# Patient Record
Sex: FEMALE | Race: WHITE | Employment: FULL TIME | URBAN - METROPOLITAN AREA
[De-identification: names, ages, dates, MRNs, and addresses within clinical notes are randomized per-mention and may not be internally consistent; named-entity substitution may affect disease eponyms.]

---

## 2019-08-23 LAB
ANTIBODY SCREEN, EXTERNAL: NEGATIVE
CHLAMYDIA, EXTERNAL: NEGATIVE
HBSAG, EXTERNAL: NEGATIVE
HIV, EXTERNAL: NON REACTIVE
N. GONORRHEA, EXTERNAL: NEGATIVE
RUBELLA, EXTERNAL: NORMAL
T. PALLIDUM, EXTERNAL: NEGATIVE
TYPE, ABO & RH, EXTERNAL: NORMAL

## 2020-02-10 LAB — GRBS, EXTERNAL: NEGATIVE

## 2020-03-01 ENCOUNTER — HOSPITAL ENCOUNTER (INPATIENT)
Age: 35
LOS: 3 days | Discharge: HOME | End: 2020-03-04
Attending: OBSTETRICS & GYNECOLOGY | Admitting: OBSTETRICS & GYNECOLOGY
Payer: COMMERCIAL

## 2020-03-01 PROBLEM — M05.20 RHEUMATOID ARTERITIS (HCC): Status: ACTIVE | Noted: 2020-03-01

## 2020-03-01 PROBLEM — Z87.42 HISTORY OF INFERTILITY: Status: ACTIVE | Noted: 2020-03-01

## 2020-03-01 PROBLEM — Z3A.39 39 WEEKS GESTATION OF PREGNANCY: Status: ACTIVE | Noted: 2020-03-01

## 2020-03-01 PROBLEM — B00.9 HSV-1 INFECTION: Status: ACTIVE | Noted: 2020-03-01

## 2020-03-01 PROBLEM — E28.2 PCOS (POLYCYSTIC OVARIAN SYNDROME): Status: ACTIVE | Noted: 2020-03-01

## 2020-03-01 LAB
ALBUMIN SERPL-MCNC: 3 G/DL (ref 3.5–5)
ALBUMIN/GLOB SERPL: 0.8 {RATIO} (ref 1.1–2.2)
ALP SERPL-CCNC: 102 U/L (ref 45–117)
ALT SERPL-CCNC: 15 U/L (ref 12–78)
ANION GAP SERPL CALC-SCNC: 6 MMOL/L (ref 5–15)
AST SERPL-CCNC: 11 U/L (ref 15–37)
BILIRUB SERPL-MCNC: 0.3 MG/DL (ref 0.2–1)
BUN SERPL-MCNC: 8 MG/DL (ref 6–20)
BUN/CREAT SERPL: 17 (ref 12–20)
CALCIUM SERPL-MCNC: 9.1 MG/DL (ref 8.5–10.1)
CHLORIDE SERPL-SCNC: 107 MMOL/L (ref 97–108)
CO2 SERPL-SCNC: 23 MMOL/L (ref 21–32)
CREAT SERPL-MCNC: 0.46 MG/DL (ref 0.55–1.02)
CREAT UR-MCNC: 113 MG/DL
ERYTHROCYTE [DISTWIDTH] IN BLOOD BY AUTOMATED COUNT: 12.7 % (ref 11.5–14.5)
GLOBULIN SER CALC-MCNC: 3.7 G/DL (ref 2–4)
GLUCOSE SERPL-MCNC: 83 MG/DL (ref 65–100)
HCT VFR BLD AUTO: 36.7 % (ref 35–47)
HGB BLD-MCNC: 12.4 G/DL (ref 11.5–16)
MCH RBC QN AUTO: 29.5 PG (ref 26–34)
MCHC RBC AUTO-ENTMCNC: 33.8 G/DL (ref 30–36.5)
MCV RBC AUTO: 87.2 FL (ref 80–99)
NRBC # BLD: 0 K/UL (ref 0–0.01)
NRBC BLD-RTO: 0 PER 100 WBC
PLATELET # BLD AUTO: 224 K/UL (ref 150–400)
PMV BLD AUTO: 10.8 FL (ref 8.9–12.9)
POTASSIUM SERPL-SCNC: 4 MMOL/L (ref 3.5–5.1)
PROT SERPL-MCNC: 6.7 G/DL (ref 6.4–8.2)
PROT UR-MCNC: 18 MG/DL (ref 0–11.9)
PROT/CREAT UR-RTO: 0.2
RBC # BLD AUTO: 4.21 M/UL (ref 3.8–5.2)
SODIUM SERPL-SCNC: 136 MMOL/L (ref 136–145)
WBC # BLD AUTO: 8.2 K/UL (ref 3.6–11)

## 2020-03-01 PROCEDURE — 74011250636 HC RX REV CODE- 250/636: Performed by: MIDWIFE

## 2020-03-01 PROCEDURE — 74011250637 HC RX REV CODE- 250/637: Performed by: MIDWIFE

## 2020-03-01 PROCEDURE — 65270000029 HC RM PRIVATE

## 2020-03-01 PROCEDURE — 36415 COLL VENOUS BLD VENIPUNCTURE: CPT

## 2020-03-01 PROCEDURE — 84156 ASSAY OF PROTEIN URINE: CPT

## 2020-03-01 PROCEDURE — 75410000002 HC LABOR FEE PER 1 HR

## 2020-03-01 PROCEDURE — 85027 COMPLETE CBC AUTOMATED: CPT

## 2020-03-01 PROCEDURE — 80053 COMPREHEN METABOLIC PANEL: CPT

## 2020-03-01 RX ORDER — ONDANSETRON 2 MG/ML
8 INJECTION INTRAMUSCULAR; INTRAVENOUS
Status: DISCONTINUED | OUTPATIENT
Start: 2020-03-01 | End: 2020-03-02

## 2020-03-01 RX ORDER — SODIUM CHLORIDE 9 MG/ML
10 INJECTION, SOLUTION INTRAVENOUS ONCE
Status: DISCONTINUED | OUTPATIENT
Start: 2020-03-01 | End: 2020-03-01 | Stop reason: CLARIF

## 2020-03-01 RX ORDER — SODIUM CHLORIDE 0.9 % (FLUSH) 0.9 %
10 SYRINGE (ML) INJECTION AS NEEDED
Status: DISCONTINUED | OUTPATIENT
Start: 2020-03-01 | End: 2020-03-02

## 2020-03-01 RX ORDER — TERBUTALINE SULFATE 1 MG/ML
0.25 INJECTION SUBCUTANEOUS AS NEEDED
Status: DISCONTINUED | OUTPATIENT
Start: 2020-03-01 | End: 2020-03-02

## 2020-03-01 RX ORDER — BUTORPHANOL TARTRATE 1 MG/ML
1 INJECTION INTRAMUSCULAR; INTRAVENOUS
Status: DISCONTINUED | OUTPATIENT
Start: 2020-03-01 | End: 2020-03-02

## 2020-03-01 RX ORDER — SODIUM CHLORIDE 0.9 % (FLUSH) 0.9 %
SYRINGE (ML) INJECTION
Status: COMPLETED
Start: 2020-03-01 | End: 2020-03-01

## 2020-03-01 RX ORDER — ACETAMINOPHEN 500 MG
1000 TABLET ORAL
Status: DISCONTINUED | OUTPATIENT
Start: 2020-03-01 | End: 2020-03-02

## 2020-03-01 RX ORDER — OXYTOCIN/0.9 % SODIUM CHLORIDE 30/500 ML
0-25 PLASTIC BAG, INJECTION (ML) INTRAVENOUS
Status: DISCONTINUED | OUTPATIENT
Start: 2020-03-02 | End: 2020-03-02

## 2020-03-01 RX ORDER — FENTANYL CITRATE 50 UG/ML
100 INJECTION, SOLUTION INTRAMUSCULAR; INTRAVENOUS
Status: DISCONTINUED | OUTPATIENT
Start: 2020-03-01 | End: 2020-03-02

## 2020-03-01 RX ORDER — SODIUM CHLORIDE, SODIUM LACTATE, POTASSIUM CHLORIDE, CALCIUM CHLORIDE 600; 310; 30; 20 MG/100ML; MG/100ML; MG/100ML; MG/100ML
125 INJECTION, SOLUTION INTRAVENOUS CONTINUOUS
Status: DISCONTINUED | OUTPATIENT
Start: 2020-03-01 | End: 2020-03-02

## 2020-03-01 RX ORDER — ZOLPIDEM TARTRATE 5 MG/1
5 TABLET ORAL
Status: DISCONTINUED | OUTPATIENT
Start: 2020-03-01 | End: 2020-03-02

## 2020-03-01 RX ADMIN — MISOPROSTOL 50 MCG: 100 TABLET ORAL at 18:57

## 2020-03-01 RX ADMIN — Medication 10 ML: at 19:25

## 2020-03-01 RX ADMIN — MISOPROSTOL 25 MCG: 100 TABLET ORAL at 16:45

## 2020-03-01 RX ADMIN — Medication 10 ML: at 23:11

## 2020-03-01 RX ADMIN — BUTORPHANOL TARTRATE 1 MG: 1 INJECTION, SOLUTION INTRAMUSCULAR; INTRAVENOUS at 23:11

## 2020-03-01 RX ADMIN — ACETAMINOPHEN 1000 MG: 500 TABLET ORAL at 22:02

## 2020-03-01 NOTE — H&P
SUNG History & Physical    Subjective:     Flori Leach is a 36 y.o. female  SIUP @ 39w0d by Estimated Date of Delivery: None noted. who arrives to L&D for induction of labor. Endorses +FM, intact membranes; Pt denies vaginal bleeding, fever/chills, chest pain, SOB, HA, scotomata, sudden swelling, nor malaise. Pregnancy problems include:   - Body mass index is 29.44 kg/m². 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    PCOS (polycystic ovarian syndrome) E28.2       Allergies  - No Known Allergies    Prenatal Labs  No results found for: RUBELLAEXT, GRBSEXT, HBSAGEXT, HIVEXT, RPREXT, TPALEXT, GONNOEXT, CHLAMEXT, [id number], [id number], RUBELLAEXT, GRBSEXT, HBSAGEXT, HIVEXT, RPREXT, TPALEXT, GONNOEXT, CHLAMEXT, [id number], [id number]      OB History  OB History        1    Para        Term                AB        Living           SAB        TAB        Ectopic        Molar        Multiple        Live Births                     PMHx  Past Medical History:   Diagnosis Date    Anemia     During RA treatments    Herpes simplex virus (HSV) infection     Cold sores, last outbreak years ago   24 [facility], female     PCOS, conceived on her own after treatment stopped    Polycystic disease, ovaries     No meds    Rheumatoid arthritis ([facility]         PSHx  History reviewed. No pertinent surgical history.      F/SHx  Family History   Problem Relation Age of Onset    Diabetes Mother     Hypertension Father     No Known Problems Sister       Social History     Socioeconomic History    Marital status:      Spouse name: Not on file    Number of children: Not on file    Years of education: Not on file    Highest education level: Not on file   Occupational History    Not on file   Social Needs    Financial resource strain: Not on file   [place] insecurity:     Worry: Not on file     Inability: Not on file    Transportation needs:     Medical: Not on file     Non-medical: Not on file   Tobacco Use    Smoking status: Never Smoker    Smokeless tobacco: Never Used   Substance and Sexual Activity    Alcohol use: Not Currently    Drug use: Not on file    Sexual activity: Not on file       Home Medications:  None        Review of Systems:  A comprehensive review of systems was negative except for that written in the History of Present Illness. Objective:     Vitals:    03/01/20 1653 03/01/20 1719   BP:  (!) 131/92   Pulse:  (!) 104   Resp:  14   Temp:  98.6 °F (37 °C)   Weight: 85.3 kg (188 lb)    Height: 5' 7\" (1.702 m)       Body mass index is 29.44 kg/m². Physical Exam:  General:  Alert, cooperative, no distress, appears stated age. Lungs:   Clear to auscultation bilaterally. Symmetrical expansion, non-labored   Heart:  Regular rate and rhythm   Abdomen:   Soft, non-tender. Gravid. Extremities: Extremities normal, atraumatic, no cyanosis or edema. Skin: Skin color, texture, turgor normal. No rashes or lesions   /Cervical Exam: no lesions, Cervical Exam  Dilation (cm): 1  Eff: 60 %  Station: -3  Position: Mid  Cervical Consistency: Soft  Vaginal exam done by? : Luda Connors CNM  Baby Position: Vertex     Cervical Consistency: Medium (1 Point)   Cervical Dilation: 1 cm to 2 cm (1 Point)   Cervical Effacement: 40-50% (1 Point)   Cervical Position: Middle (1 Point)   Fetal Station: -4 to -3 (0 Points)   Dodge's Score Total: 4    Pelvimetry: Adequate  Fetal Size: AGA      Electronic Fetal Monitoring    Fetal Heart Rate:      Patient Vitals for the past 4 hrs:    Mode Fetal Heart Rate Variability Decelerations Accelerations RN Reviewed Strip?   03/01/20 1815 External 130    Yes   03/01/20 1800 External 130 6-25 BPM None Yes Yes   03/01/20 1745 External 125    Yes   03/01/20 1730 External 120 6-25 BPM None Yes Yes   03/01/20 1715 External 120    Yes   20 1700 External 125 6-25 BPM None Yes Yes   20 1645 External 125    Yes   20 1624 External 140 6-25 BPM None Yes Yes   20 1608 External 140         Uterine Contractions: uterine irritability, relaxed to palpation    Assessment:   36 y.o. female  SIUP @  39w0 d by ZHANE of Estimated Date of Delivery: None noted. 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     EFM:   - Category 1  - NST: Fetal NST Findings: reactive    Plan:   Admit to L&D for Inpatient     Tests/Labs/Monitoring ordered: Electronic Fetal Monitoring Continuous EFM and NST, SVE, CBC w/o DIF and Sample to blood bank (hold)  GBS negative  ambien for sleep    Induction:   - 25mcg miso vaginally; then in 2 hrs, if no uterine tachysystole and Cat I    - in 2-4 hours reassess for ICC placement   - 50mcg miso buccal q4hrs;   - reassess for ICC placement s/p 2 doses of miso   - Goal YEPEZ of 7+ prior to initiating pitocin titration    - IF ICC placement successful, 12hrs post ICC or if self displaces, initiate pitocin titration    Review of prenatal records  Review and sign consents   Patient Education:   - Pain management, as indicated; R/B/SE of IV meds, nitrous oxide, and epidural discussed.   -Induction of labor R/B/SE of misoprostol, pitocin, intracervical catheter    CNM management    Postpartum Plan:  Vaccinations (maternal) needed: none  Infant Feeding plans: Breastfeeding    Patient has been counseled regarding this plan of care and is in agreement; all questions answered.     Collaborative MD: Aldair Mcghee      Signed By:  David Keene CNM      3/1/2020 4:27 PM

## 2020-03-01 NOTE — PROGRESS NOTES
1600 Pt arrived ambulatory from home for scheduled induction. 1640 BREANN Lucero [facility], at bedside, discussing plan of care with pt and . SVE 1/60/-3, Cytotec placed without difficulty. 1940 Bedside report given to Sienna Colmenares RN.

## 2020-03-02 ENCOUNTER — ANESTHESIA (OUTPATIENT)
Dept: LABOR AND DELIVERY | Age: 35
End: 2020-03-02
Payer: COMMERCIAL

## 2020-03-02 ENCOUNTER — ANESTHESIA EVENT (OUTPATIENT)
Dept: LABOR AND DELIVERY | Age: 35
End: 2020-03-02
Payer: COMMERCIAL

## 2020-03-02 PROBLEM — Z34.90 PREGNANCY: Status: ACTIVE | Noted: 2020-03-02

## 2020-03-02 PROCEDURE — 74011250636 HC RX REV CODE- 250/636: Performed by: ANESTHESIOLOGY

## 2020-03-02 PROCEDURE — 3E0P7VZ INTRODUCTION OF HORMONE INTO FEMALE REPRODUCTIVE, VIA NATURAL OR ARTIFICIAL OPENING: ICD-10-PCS | Performed by: OBSTETRICS & GYNECOLOGY

## 2020-03-02 PROCEDURE — 75410000003 HC RECOV DEL/VAG/CSECN EA 0.5 HR

## 2020-03-02 PROCEDURE — 74011000250 HC RX REV CODE- 250: Performed by: ANESTHESIOLOGY

## 2020-03-02 PROCEDURE — 74011250637 HC RX REV CODE- 250/637: Performed by: OBSTETRICS & GYNECOLOGY

## 2020-03-02 PROCEDURE — 74011250637 HC RX REV CODE- 250/637: Performed by: MIDWIFE

## 2020-03-02 PROCEDURE — 77030002933 HC SUT MCRYL J&J -A

## 2020-03-02 PROCEDURE — 65410000002 HC RM PRIVATE OB

## 2020-03-02 PROCEDURE — 00HU33Z INSERTION OF INFUSION DEVICE INTO SPINAL CANAL, PERCUTANEOUS APPROACH: ICD-10-PCS | Performed by: ANESTHESIOLOGY

## 2020-03-02 PROCEDURE — 76060000078 HC EPIDURAL ANESTHESIA

## 2020-03-02 PROCEDURE — A4300 CATH IMPL VASC ACCESS PORTAL: HCPCS

## 2020-03-02 PROCEDURE — 77030031139 HC SUT VCRL2 J&J -A

## 2020-03-02 PROCEDURE — 74011250636 HC RX REV CODE- 250/636: Performed by: MIDWIFE

## 2020-03-02 PROCEDURE — 77030019905 HC CATH URETH INTMIT MDII -A

## 2020-03-02 PROCEDURE — 3E033VJ INTRODUCTION OF OTHER HORMONE INTO PERIPHERAL VEIN, PERCUTANEOUS APPROACH: ICD-10-PCS | Performed by: OBSTETRICS & GYNECOLOGY

## 2020-03-02 PROCEDURE — 75410000002 HC LABOR FEE PER 1 HR

## 2020-03-02 PROCEDURE — 75410000000 HC DELIVERY VAGINAL/SINGLE

## 2020-03-02 PROCEDURE — 0KQM0ZZ REPAIR PERINEUM MUSCLE, OPEN APPROACH: ICD-10-PCS | Performed by: OBSTETRICS & GYNECOLOGY

## 2020-03-02 RX ORDER — LIDOCAINE HYDROCHLORIDE AND EPINEPHRINE 15; 5 MG/ML; UG/ML
4.5 INJECTION, SOLUTION EPIDURAL AS NEEDED
Status: DISCONTINUED | OUTPATIENT
Start: 2020-03-02 | End: 2020-03-02

## 2020-03-02 RX ORDER — FENTANYL/BUPIVACAINE/NS/PF 2-1250MCG
1-16 PREFILLED PUMP RESERVOIR EPIDURAL CONTINUOUS
Status: DISCONTINUED | OUTPATIENT
Start: 2020-03-02 | End: 2020-03-02

## 2020-03-02 RX ORDER — AMMONIA 15 % (W/V)
1 AMPUL (EA) INHALATION AS NEEDED
Status: DISCONTINUED | OUTPATIENT
Start: 2020-03-02 | End: 2020-03-04 | Stop reason: HOSPADM

## 2020-03-02 RX ORDER — BUPIVACAINE HYDROCHLORIDE 2.5 MG/ML
75 INJECTION, SOLUTION EPIDURAL; INFILTRATION; INTRACAUDAL ONCE
Status: DISCONTINUED | OUTPATIENT
Start: 2020-03-02 | End: 2020-03-02

## 2020-03-02 RX ORDER — IBUPROFEN 400 MG/1
800 TABLET ORAL EVERY 8 HOURS
Status: DISCONTINUED | OUTPATIENT
Start: 2020-03-02 | End: 2020-03-04 | Stop reason: HOSPADM

## 2020-03-02 RX ORDER — OXYTOCIN/RINGER'S LACTATE 20/1000 ML
125 PLASTIC BAG, INJECTION (ML) INTRAVENOUS AS NEEDED
Status: DISCONTINUED | OUTPATIENT
Start: 2020-03-02 | End: 2020-03-04 | Stop reason: HOSPADM

## 2020-03-02 RX ORDER — FENTANYL CITRATE 50 UG/ML
100 INJECTION, SOLUTION INTRAMUSCULAR; INTRAVENOUS ONCE
Status: DISCONTINUED | OUTPATIENT
Start: 2020-03-02 | End: 2020-03-02

## 2020-03-02 RX ORDER — SIMETHICONE 80 MG
80 TABLET,CHEWABLE ORAL
Status: DISCONTINUED | OUTPATIENT
Start: 2020-03-02 | End: 2020-03-04 | Stop reason: HOSPADM

## 2020-03-02 RX ORDER — LIDOCAINE HYDROCHLORIDE AND EPINEPHRINE 15; 5 MG/ML; UG/ML
INJECTION, SOLUTION EPIDURAL AS NEEDED
Status: DISCONTINUED | OUTPATIENT
Start: 2020-03-02 | End: 2020-03-02 | Stop reason: HOSPADM

## 2020-03-02 RX ORDER — SODIUM CHLORIDE 0.9 % (FLUSH) 0.9 %
5-40 SYRINGE (ML) INJECTION EVERY 8 HOURS
Status: DISCONTINUED | OUTPATIENT
Start: 2020-03-02 | End: 2020-03-03

## 2020-03-02 RX ORDER — HYDROCORTISONE ACETATE PRAMOXINE HCL 2.5; 1 G/100G; G/100G
CREAM TOPICAL AS NEEDED
Status: DISCONTINUED | OUTPATIENT
Start: 2020-03-02 | End: 2020-03-04 | Stop reason: HOSPADM

## 2020-03-02 RX ORDER — OXYTOCIN/RINGER'S LACTATE 20/1000 ML
999 PLASTIC BAG, INJECTION (ML) INTRAVENOUS AS NEEDED
Status: DISCONTINUED | OUTPATIENT
Start: 2020-03-02 | End: 2020-03-04 | Stop reason: HOSPADM

## 2020-03-02 RX ORDER — DOCUSATE SODIUM 100 MG/1
100 CAPSULE, LIQUID FILLED ORAL
Status: DISCONTINUED | OUTPATIENT
Start: 2020-03-02 | End: 2020-03-04 | Stop reason: HOSPADM

## 2020-03-02 RX ORDER — NALOXONE HYDROCHLORIDE 0.4 MG/ML
0.4 INJECTION, SOLUTION INTRAMUSCULAR; INTRAVENOUS; SUBCUTANEOUS AS NEEDED
Status: DISCONTINUED | OUTPATIENT
Start: 2020-03-02 | End: 2020-03-02

## 2020-03-02 RX ORDER — SODIUM CHLORIDE 0.9 % (FLUSH) 0.9 %
5-40 SYRINGE (ML) INJECTION AS NEEDED
Status: DISCONTINUED | OUTPATIENT
Start: 2020-03-02 | End: 2020-03-04 | Stop reason: HOSPADM

## 2020-03-02 RX ORDER — ACETAMINOPHEN 325 MG/1
650 TABLET ORAL
Status: DISCONTINUED | OUTPATIENT
Start: 2020-03-02 | End: 2020-03-04 | Stop reason: HOSPADM

## 2020-03-02 RX ORDER — EPHEDRINE SULFATE/0.9% NACL/PF 50 MG/5 ML
12.5 SYRINGE (ML) INTRAVENOUS
Status: DISCONTINUED | OUTPATIENT
Start: 2020-03-02 | End: 2020-03-02

## 2020-03-02 RX ORDER — ONDANSETRON 4 MG/1
4 TABLET, ORALLY DISINTEGRATING ORAL
Status: DISCONTINUED | OUTPATIENT
Start: 2020-03-02 | End: 2020-03-04 | Stop reason: HOSPADM

## 2020-03-02 RX ORDER — OXYCODONE AND ACETAMINOPHEN 5; 325 MG/1; MG/1
1 TABLET ORAL
Status: DISCONTINUED | OUTPATIENT
Start: 2020-03-02 | End: 2020-03-04 | Stop reason: HOSPADM

## 2020-03-02 RX ORDER — FENTANYL CITRATE 50 UG/ML
INJECTION, SOLUTION INTRAMUSCULAR; INTRAVENOUS AS NEEDED
Status: DISCONTINUED | OUTPATIENT
Start: 2020-03-02 | End: 2020-03-02 | Stop reason: HOSPADM

## 2020-03-02 RX ORDER — BUPIVACAINE HYDROCHLORIDE 2.5 MG/ML
INJECTION, SOLUTION EPIDURAL; INFILTRATION; INTRACAUDAL AS NEEDED
Status: DISCONTINUED | OUTPATIENT
Start: 2020-03-02 | End: 2020-03-02 | Stop reason: HOSPADM

## 2020-03-02 RX ORDER — HYDROCORTISONE 1 %
CREAM (GRAM) TOPICAL AS NEEDED
Status: DISCONTINUED | OUTPATIENT
Start: 2020-03-02 | End: 2020-03-04 | Stop reason: HOSPADM

## 2020-03-02 RX ORDER — DIPHENHYDRAMINE HCL 25 MG
25 CAPSULE ORAL
Status: DISCONTINUED | OUTPATIENT
Start: 2020-03-02 | End: 2020-03-04 | Stop reason: HOSPADM

## 2020-03-02 RX ADMIN — OXYTOCIN-SODIUM CHLORIDE 0.9% IV SOLN 30 UNIT/500ML 333 MILLI-UNITS/MIN: 30-0.9/5 SOLUTION at 18:55

## 2020-03-02 RX ADMIN — BUPIVACAINE HYDROCHLORIDE 3 ML: 2.5 INJECTION, SOLUTION EPIDURAL; INFILTRATION; INTRACAUDAL; PERINEURAL at 10:46

## 2020-03-02 RX ADMIN — SODIUM CHLORIDE, SODIUM LACTATE, POTASSIUM CHLORIDE, AND CALCIUM CHLORIDE 125 ML/HR: 600; 310; 30; 20 INJECTION, SOLUTION INTRAVENOUS at 16:12

## 2020-03-02 RX ADMIN — OXYTOCIN-SODIUM CHLORIDE 0.9% IV SOLN 30 UNIT/500ML 8 MILLI-UNITS/MIN: 30-0.9/5 SOLUTION at 13:31

## 2020-03-02 RX ADMIN — SODIUM CHLORIDE, SODIUM LACTATE, POTASSIUM CHLORIDE, AND CALCIUM CHLORIDE 125 ML/HR: 600; 310; 30; 20 INJECTION, SOLUTION INTRAVENOUS at 06:23

## 2020-03-02 RX ADMIN — BUPIVACAINE HYDROCHLORIDE 3 ML: 2.5 INJECTION, SOLUTION EPIDURAL; INFILTRATION; INTRACAUDAL; PERINEURAL at 10:49

## 2020-03-02 RX ADMIN — FENTANYL CITRATE 100 MCG: 50 INJECTION, SOLUTION INTRAMUSCULAR; INTRAVENOUS at 10:43

## 2020-03-02 RX ADMIN — LIDOCAINE HYDROCHLORIDE,EPINEPHRINE BITARTRATE 5 ML: 15; .005 INJECTION, SOLUTION EPIDURAL; INFILTRATION; INTRACAUDAL; PERINEURAL at 10:39

## 2020-03-02 RX ADMIN — IBUPROFEN 800 MG: 400 TABLET, FILM COATED ORAL at 20:59

## 2020-03-02 RX ADMIN — Medication 10 ML: at 06:23

## 2020-03-02 RX ADMIN — SODIUM CHLORIDE, SODIUM LACTATE, POTASSIUM CHLORIDE, AND CALCIUM CHLORIDE 999 ML/HR: 600; 310; 30; 20 INJECTION, SOLUTION INTRAVENOUS at 10:02

## 2020-03-02 RX ADMIN — SODIUM CHLORIDE, SODIUM LACTATE, POTASSIUM CHLORIDE, AND CALCIUM CHLORIDE 999 ML/HR: 600; 310; 30; 20 INJECTION, SOLUTION INTRAVENOUS at 10:38

## 2020-03-02 RX ADMIN — OXYTOCIN-SODIUM CHLORIDE 0.9% IV SOLN 30 UNIT/500ML 11 MILLI-UNITS/MIN: 30-0.9/5 SOLUTION at 16:41

## 2020-03-02 RX ADMIN — Medication 10 ML/HR: at 10:57

## 2020-03-02 RX ADMIN — OXYTOCIN-SODIUM CHLORIDE 0.9% IV SOLN 30 UNIT/500ML 2 MILLI-UNITS/MIN: 30-0.9/5 SOLUTION at 06:26

## 2020-03-02 RX ADMIN — ZOLPIDEM TARTRATE 5 MG: 5 TABLET ORAL at 01:59

## 2020-03-02 NOTE — PROGRESS NOTES
0740 Bedside, Verbal and Written shift change report given to VIKTORIYA Clarke, MICHELLE (oncoming nurse) by VIKTORIYA Latif, MICHELLE (offgoing nurse). Report included the following information SBAR, Intake/Output, MAR, Accordion, Recent Results, Med Rec Status and Alarm Parameters . 1762 patient switched to portable monitoring, patient encouraged to get up out of bed and move. 2278 Dr. Arron Cisneros at bedside discussing plan of care. Plan to assess cervical ripening balloon around 0945    0955 patient called out. Appears gross rupture of membranes. 7918 spoke with Dr. Arron Cisneros, ok to removed cervical ripening balloon. 56 Dr. Brandi Rivas at bedside for epidural.    1145 Bedside, Verbal and Written shift change report given to ROBBIE Nuñez RN and ROBBIE Matias RN (oncoming nurse) by VIKTORIYA Clarke RN (offgoing nurse). Report included the following information SBAR, MAR, Accordion, Recent Results, Med Rec Status and Alarm Parameters .

## 2020-03-02 NOTE — ANESTHESIA PROCEDURE NOTES
Epidural Block    Performed by: Alva Pulido MD  Authorized by: Alva Pulido MD     Pre-Procedure  Indication: at surgeon's request and post-op pain management    Preanesthetic Checklist: patient identified, risks and benefits discussed, anesthesia consent, site marked, patient being monitored, timeout performed and anesthesia consent      Epidural:   Patient position:  Seated  Prep region:  Lumbar  Prep: DuraPrep    Location:  L2-3    Needle and Epidural Catheter:   Needle Type:  Tuohy  Needle Gauge:  17 G  Injection Technique:  Loss of resistance using saline  Attempts:  1  Catheter Size:  19 G  Catheter at Skin Depth (cm):  13  Depth in Epidural Space (cm):  4  Events: no blood with aspiration, no cerebrospinal fluid with aspiration and no paresthesia    Test Dose:  Lidocaine 1.5% w/ epi and negative    Assessment:   Catheter Secured:  Tegaderm  Insertion:  Uncomplicated  Patient tolerance:  Patient tolerated the procedure well with no immediate complications

## 2020-03-02 NOTE — PROGRESS NOTES
Patient comfortable with epidural but she is feeling some pressure.     FHTs baseline 130, moderate variability, accels present, decels absent  TOCO q 2-3 minutes  Cx 10/100/0  Pit @    Will begin pushing when pt feeling constant pressure or within the hour, whichever comes first.

## 2020-03-02 NOTE — ANESTHESIA PREPROCEDURE EVALUATION
Relevant Problems   No relevant active problems       Anesthetic History   No history of anesthetic complications            Review of Systems / Medical History  Patient summary reviewed, nursing notes reviewed and pertinent labs reviewed    Pulmonary  Within defined limits                 Neuro/Psych   Within defined limits           Cardiovascular  Within defined limits                Exercise tolerance: >4 METS     GI/Hepatic/Renal  Within defined limits              Endo/Other        Arthritis and anemia     Other Findings   Comments: RA           Physical Exam    Airway  Mallampati: II  TM Distance: > 6 cm  Neck ROM: normal range of motion   Mouth opening: Normal     Cardiovascular  Regular rate and rhythm,  S1 and S2 normal,  no murmur, click, rub, or gallop             Dental  No notable dental hx       Pulmonary  Breath sounds clear to auscultation               Abdominal  GI exam deferred       Other Findings            Anesthetic Plan    ASA: 2  Anesthesia type: epidural          Induction: Intravenous  Anesthetic plan and risks discussed with: Patient

## 2020-03-02 NOTE — PROGRESS NOTES
9500 Bedside, Verbal and Written shift change report given to VIKTORIYA Moses, RN (oncoming nurse) by VIKTORIYA Doll RN (offgoing nurse). Report included the following information SBAR, Intake/Output, MAR, Accordion, Recent Results, Med Rec Status and Alarm Parameters . 8005 patient switched to portable monitoring, patient encouraged to get up out of bed and move. 5976 Dr. Elliot Reeves at bedside discussing plan of care. Plan to assess cervical ripening balloon around [id number] 4195 patient called out. Appears gross rupture of membranes. 3484 spoke with Dr. Elliot Reeves, ok to removed cervical ripening balloon.

## 2020-03-02 NOTE — PROGRESS NOTES
Assumed care of patient this AM from DELANEY Bailey. Patient is a 37 yo G1 @ 39 1/7 wks undergoing IOL for well controlled RA. She has been off her monthly infusions since the start of pregnancy and reports no flare ups. She is currently on pitocin @ 6 with a cervical ripening balloon. Mild range BPs with negative symptoms and neg labs.     FHTs baseline 140, moderate variability, accels present, decels absent  TOCO q 2 minutes  Cx deferred (will check to see if balloon comes out at 0945)  /92    Cont current management plan

## 2020-03-02 NOTE — PROGRESS NOTES
Pt coping well with misoprostol, consents to [facility] placement. Cervical Exam  Dilation (cm): 1  Eff: 70 %  Station: -3  Position: Anterior  Cervical Consistency: Soft  Vaginal exam done by? : annie ivey cnm  Baby Position: Vertex    Patient Vitals for the past 12 hrs:   Temp Pulse Resp BP   03/01/20 2132  100  132/90   03/01/20 2005 98.4 °F (36.9 °C) 82 16 (!) 135/93   03/01/20 1719 98.6 °F (37 °C) (!) 104 14 (!) 131/92       ICC placed with 60/60 NS in each balloon  Pre-E labs ordered for mildly elevated BPs- explained indication for labs to pt/FOB  Will continue to administer 50mcg miso q4h, with last dose @ 0300.  Pitocin to start at 0400  D/C ICC @ 0930 if it has not dislodged on its own  EFM continuous for 2 hours after miso, may have intermittent EFM if only irregular, mild UCs and Cat I    Josafat Beck CNM  03/01/20  9:51 PM

## 2020-03-03 PROCEDURE — 65410000002 HC RM PRIVATE OB

## 2020-03-03 PROCEDURE — 74011250637 HC RX REV CODE- 250/637: Performed by: OBSTETRICS & GYNECOLOGY

## 2020-03-03 RX ORDER — IBUPROFEN 600 MG/1
600 TABLET ORAL
Qty: 30 TAB | Refills: 1 | Status: SHIPPED | OUTPATIENT
Start: 2020-03-03

## 2020-03-03 RX ADMIN — IBUPROFEN 800 MG: 400 TABLET, FILM COATED ORAL at 21:48

## 2020-03-03 RX ADMIN — ACETAMINOPHEN 650 MG: 325 TABLET ORAL at 14:07

## 2020-03-03 RX ADMIN — ACETAMINOPHEN 650 MG: 325 TABLET ORAL at 05:55

## 2020-03-03 RX ADMIN — ACETAMINOPHEN 650 MG: 325 TABLET ORAL at 10:30

## 2020-03-03 RX ADMIN — DOCUSATE SODIUM 100 MG: 100 CAPSULE, LIQUID FILLED ORAL at 21:50

## 2020-03-03 RX ADMIN — IBUPROFEN 800 MG: 400 TABLET, FILM COATED ORAL at 05:55

## 2020-03-03 RX ADMIN — ACETAMINOPHEN 650 MG: 325 TABLET ORAL at 02:39

## 2020-03-03 RX ADMIN — IBUPROFEN 800 MG: 400 TABLET, FILM COATED ORAL at 14:07

## 2020-03-03 RX ADMIN — DOCUSATE SODIUM 100 MG: 100 CAPSULE, LIQUID FILLED ORAL at 02:39

## 2020-03-03 NOTE — L&D DELIVERY NOTE
Delivery Summary  Patient: Dave Cowan             Circumcision:   NA-female [place])  Additional Delivery Comments - Patient underwent an uncomplicated IOL for RA. She progressed to full dilation and pushed for approximately 2 hours to deliver a VFI in OA position with loose nuchal cord easily reduced. No difficulty with delivery of shoulders/body. Cord clamping was delayed x 3 minutes then cut by FOB. Intact placenta with 3VC delivered easily. 2nd degree vaginal and periurethral/periclitoral lacerations were repaired in standard fashion with 2.0 monocryl and 3.0 vicryl, respectively. . Information for the patient's :  Akil Casey [[id number]]       Labor Events:    Labor: No    Steroids: None   Cervical Ripening Date/Time: 3/1/2020 4:45 PM   Cervical Ripening Type: Misoprostol; Ricketts/EASI   Antibiotics During Labor: No   Rupture Identifier: Sac 1    Rupture Date/Time: 3/2/2020 9:55 AM   Rupture Type: SROM   Amniotic Fluid Volume: Moderate    Amniotic Fluid Description: Clear    Amniotic Fluid Odor: None    Induction: Oxytocin       Induction Date/Time: 3/2/2020 6:30 AM    Indications for Induction: Elective; Other(comment)    Augmentation: None   Augmentation Date/Time:      Indications for Augmentation:     Labor complications: None       Additional complications:        Delivery Events:  Indications For Episiotomy:     Episiotomy: None   Perineal Laceration(s): None   Repaired:     Periurethral Laceration Location:      Repaired: Yes   Labial Laceration Location:     Repaired:     Sulcal Laceration Location:     Repaired:     Vaginal Laceration Location: right   Repaired: Yes   Cervical Laceration Location:     Repaired:     Repair Suture: Vicryl 3-0;Chromic 2-0   Number of Repair Packets: 2   Estimated Blood Loss (ml):  ml     Delivery Date: 3/2/2020    Delivery Time: 6:05 PM  Delivery Type: Vaginal, Spontaneous  Sex:  Female    Gestational Age: 36w3d Delivery Clinician:  Kelly Blank  Living Status: Living   Delivery Location: L&D rm 4          APGARS  One minute Five minutes Ten minutes   Skin color: 1   1        Heart rate: 2   2        Grimace: 2   2        Muscle tone: 2   2        Breathin   2        Totals: 8   9            Presentation: Vertex    Position:        Resuscitation Method:  Suctioning-bulb; Tactile Stimulation     Meconium Stained: None      Cord Information: 3 Vessels  Complications: Nuchal Cord Without Compressions  Cord around: head  Delayed cord clamping? Yes  Cord clamped date/time:   Disposition of Cord Blood:      Blood Gases Sent?: No    Placenta:  Date/Time: 3/2/2020  6:18 PM  Removal: Manual Removal      Appearance: Normal     Runnells Measurements:  Birth Weight: 3.105 kg      Birth Length: 49.5 cm      Head Circumference: 33.5 cm      Chest Circumference:       Abdominal Girth: 30 cm    Other Providers:   Akhil PEÑA;EVER JOYCE;CARLOS HADLEY;VERONICA SHIN, Obstetrician;Primary Nurse;Primary  Nurse;Primary Nurse           Cord pH:  none    Episiotomy: None   Laceration(s): None     Estimated Blood Loss (ml):     Labor Events  Method: Oxytocin      Augmentation: None   Cervical Ripening: 3/1/2020  4:45 PM  Misoprostol; [facility] Problems  Date Reviewed: 3/1/2020          Codes Class Noted POA    Pregnancy ICD-10-CM: Z34.90  ICD-9-CM: V22.2  3/2/2020 Unknown        39 weeks gestation of pregnancy ICD-10-CM: Z3A.39  ICD-9-CM: V22.2  3/1/2020 Yes        [facility] ICD-10-CM: M05.20  ICD-9-CM: 714.2  3/1/2020 Yes        History of infertility ICD-10-CM: Z87.42  ICD-9-CM: V13.29  3/1/2020 Yes    Overview Signed 3/1/2020  5:44 PM by Didi Moore CNM     Spontaneous conception             HSV-1 infection ICD-10-CM: B00.9  ICD-9-CM: 054.9  3/1/2020 Yes    Overview Signed 3/1/2020  5:45 PM by Didi Moore CNM     No active oral lesions             PCOS (polycystic ovarian syndrome) ICD-10-CM: E28.2  ICD-9-CM: 256.4  3/1/2020 Yes              Operative Vaginal Delivery - none    Group B Strep:   Lab Results   Component Value Date/Time    GrBStrep, External Negative 02/10/2020     Information for the patient's :  Rosario Garrido [[id number]]   No results found for: ABORH, PCTABR, PCTDIG, BILI, ABORHEXT, ABORH    No results found for: APH, APCO2, APO2, AHCO3, ABEC, ABDC, O2ST, EPHV, PCO2V, PO2V, HCO3V, EBEV, EBDV, SITE, RSCOM

## 2020-03-03 NOTE — PROGRESS NOTES
TRANSFER - IN REPORT:    Verbal report received from ROBBIE Nuñez RN (name) on Kaela Gloria  being received from L&D(unit) for routine progression of care      Report consisted of patients Situation, Background, Assessment and   Recommendations(SBAR). Information from the following report(s) SBAR, Kardex, Procedure Summary, Intake/Output, MAR and Recent Results was reviewed with the receiving nurse. Opportunity for questions and clarification was provided. Assessment completed upon patients arrival to unit and care assumed.

## 2020-03-03 NOTE — PROGRESS NOTES
TRANSFER - OUT REPORT:    Verbal report given to denton He Che RN (name) on Khadra Rodriguez  being transferred to [facility](unit) for routine progression of care       Report consisted of patients Situation, Background, Assessment and   Recommendations(SBAR). Information from the following report(s) SBAR, Kardex, MAR, Accordion and Recent Results was reviewed with the receiving nurse. Lines:   Peripheral IV 03/01/20 Anterior;Distal;Left Forearm (Active)   Site Assessment Clean, dry, & intact 3/2/2020  7:50 AM   Phlebitis Assessment 0 3/2/2020  7:50 AM   Infiltration Assessment 0 3/2/2020  7:50 AM   Dressing Status Clean, dry, & intact 3/2/2020  7:50 AM   Dressing Type Tape;Transparent 3/2/2020  7:50 AM   Hub Color/Line Status Pink; Infusing 3/2/2020  7:50 AM   Alcohol Cap Used Yes 3/1/2020  7:31 PM        Opportunity for questions and clarification was provided.       Patient transported with:   Registered Nurse

## 2020-03-03 NOTE — DISCHARGE SUMMARY
Obstetrical Discharge Summary     Name: Tierra Dow MRN: [id number]  SSN: [id number]    YOB: 1985  Age: 36 y.o. Sex: female      Admit Date: 3/1/2020    Discharge Date: 3/4/2020    Admitting Physician: Josey Carias MD     Attending Physician:  Roshni Levin MD     Admission Diagnoses: Pregnancy [Z34.90]    Discharge Diagnoses:   Information for the patient's :  Gib Plum [[id number]]   Delivery of a 3.105 kg female infant via Vaginal, Spontaneous on 3/2/2020 at 6:05 PM  by Heath De Dios. Apgars were 8  and 9 . Additional Diagnoses:   Hospital Problems  Date Reviewed: 3/1/2020          Codes Class Noted POA    Pregnancy ICD-10-CM: Z34.90  ICD-9-CM: V22.2  3/2/2020 Unknown        39 weeks gestation of pregnancy ICD-10-CM: Z3A.39  ICD-9-CM: V22.2  3/1/2020 Yes        [facility] ICD-10-CM: M05.20  ICD-9-CM: 714.2  3/1/2020 Yes        History of infertility ICD-10-CM: Z87.42  ICD-9-CM: V13.29  3/1/2020 Yes    Overview Signed 3/1/2020  5:44 PM by Jackson Zhang CNM     Spontaneous conception             HSV-1 infection ICD-10-CM: B00.9  ICD-9-CM: 054.9  3/1/2020 Yes    Overview Signed 3/1/2020  5:45 PM by Jackson Zhang CNM     No active oral lesions             PCOS (polycystic ovarian syndrome) ICD-10-CM: E28.2  ICD-9-CM: 256.4  3/1/2020 Yes             Lab Results   Component Value Date/Time    Rubella, External Immune 2019    GrBStrep, External Negative 02/10/2020       Hospital Course: Normal hospital course following the delivery. Patient Instructions:   Current Discharge Medication List      START taking these medications    Details   ibuprofen (MOTRIN) 600 mg tablet Take 1 Tab by mouth every six (6) hours as needed for Pain. Qty: 30 Tab, Refills: 1         CONTINUE these medications which have NOT CHANGED    Details   PNV No12-Iron-FA-DSS-OM-3 29 mg iron-1 mg -50 mg CPKD Take  by mouth.              Disposition at Discharge: Home or self care    Condition at Discharge: Stable    Reference my discharge instructions.     Follow-up Appointments   Procedures    FOLLOW UP VISIT Appointment in: 6 Weeks     Standing Status:   Standing     Number of Occurrences:   1     Order Specific Question:   Appointment in     Answer:   6 Weeks        Signed By:  Yoandy Lindsey MD     March 3, 2020

## 2020-03-03 NOTE — PROGRESS NOTES
Bedside shift change report given to [employer] (oncoming nurse) by [employer] RN (offgoing nurse). Report included the following information SBAR, Kardex, MAR, and Recent Results.

## 2020-03-03 NOTE — PROGRESS NOTES
Post-Partum Day Number 1 Progress Note    Josie Hutton     Assessment: Doing well, post partum day 1    Plan:  1. Continue routine postpartum and perineal care as well as maternal education. 2. Stable RA --> not on any medications. 3. Anticipate discharge home in AM.     Information for the patient's :  Lino Antoine [[id number]]   Vaginal, Spontaneous   Patient doing well without significant complaint. Voiding without difficulty, normal lochia. Vitals:  Visit Vitals  /89 (BP 1 Location: Right arm, BP Patient Position: At rest)   Pulse 83   Temp 98.3 °F (36.8 °C)   Resp 16   Ht 5' 7\" (1.702 m)   Wt 85.3 kg (188 lb)   SpO2 95%   Breastfeeding Unknown   BMI 29.44 kg/m²     Temp (24hrs), Av.7 °F (37.1 °C), Min:98 °F (36.7 °C), Max:99.9 °F (37.7 °C)        Exam:   Patient without distress. Abdomen soft, fundus firm, nontender                Perineum with normal lochia noted. Lower extremities are negative for swelling, cords or tenderness. Labs:     Lab Results   Component Value Date/Time    WBC 8.2 2020 07:22 PM    HGB 12.4 2020 07:22 PM    HCT 36.7 2020 07:22 PM    PLATELET 551  07:22 PM       No results found for this or any previous visit (from the past 24 hour(s)).

## 2020-03-03 NOTE — DISCHARGE INSTRUCTIONS
POSTPARTUM DISCHARGE INSTRUCTIONS       Name:  Betsy Farr  YOB: 1985  Admission Diagnosis:  Pregnancy [Z34.90]     Discharge Diagnosis:    Problem List as of 3/3/2020 Date Reviewed: 3/1/2020          Codes Class Noted - Resolved    Pregnancy ICD-10-CM: Z34.90  ICD-9-CM: V22.2  3/2/2020 - Present        39 weeks gestation of pregnancy ICD-10-CM: Z3A.39  ICD-9-CM: V22.2  3/1/2020 - Present        Rheumatoid arteritis ([address] ICD-10-CM: M05.20  ICD-9-CM: 714.2  3/1/2020 - Present        History of infertility ICD-10-CM: Z87.42  ICD-9-CM: V13.29  3/1/2020 - Present    Overview Signed 3/1/2020  5:44 PM by Deborah Castro CNM     Spontaneous conception             HSV-1 infection ICD-10-CM: B00.9  ICD-9-CM: 054.9  3/1/2020 - Present    Overview Signed 3/1/2020  5:45 PM by Deborah Castro CNM     No active oral lesions             PCOS (polycystic ovarian syndrome) ICD-10-CM: E28.2  ICD-9-CM: 256.4  3/1/2020 - Present            Attending Physician:  Tanmay Mayer MD    Delivery Type:  Vaginal Childbirth: What To Expect At Home    Your Recovery: Your body will slowly heal in the next few weeks. It is easy to get too tired and overwhelmed during the first weeks after your baby is born. Changes in your hormones can shift your mood without warning. You may find it hard to meet the extra demands on your energy and time. Take it easy on yourself. Follow-up care is a key part of your treatment and safety. Be sure to make and go to all appointments, and call your doctor if you are having problems. It's also a good idea to know your test results and keep a list of the medicines you take. How can you care for yourself at home? Vaginal bleeding and cramps  · After delivery, you will have a bloody discharge from the vagina. This will turn pink within a week and then white or yellow after about 10 days. It may last for 2 to 4 weeks or longer, until the uterus has healed.  Use pads instead of tampons until you stop bleeding. · Do not worry if you pass some blood clots, as long as they are smaller than a golf ball. If you have a tear or stitches in your vaginal area, change the pad at least every 4 hours to prevent soreness and infection. · You may have cramps for the first few days after childbirth. These are normal and occur as the uterus shrinks to normal size. Take an over-the-counter pain medicine, such as acetaminophen (Tylenol), ibuprofen (Advil, Motrin), or naproxen (Aleve), for cramps. Read and follow all instructions on the label. Do not take aspirin, because it can cause more bleeding. Do not take acetaminophen (Tylenol) and other acetaminophen containing medications (i.e. Percocet) at the same time. Breast fullness  · Your breasts may overfill (engorge) in the first few days after delivery. To help milk flow and to relieve pain, warm your breasts in the shower or by using warm, moist towels before nursing. · If you are not nursing, do not put warmth on your breasts or touch your breasts. Wear a tight bra or sports bra and use ice until the fullness goes away. This usually takes 2 to 3 days. · Put ice or a cold pack on your breast after nursing to reduce swelling and pain. Put a thin cloth between the ice and your skin. Activity  · Eat a balanced diet. Do not try to lose weight by cutting calories. Keep taking your prenatal vitamins, or take a multivitamin. · Get as much rest as you can. Try to take naps when your baby sleeps during the day. · Get some exercise every day. But do not do any heavy exercise until your doctor says it is okay. · Wait until you are healed (about 4 to 6 weeks) before you have sexual intercourse. Your doctor will tell you when it is okay to have sex. · Talk to your doctor about birth control. You can get pregnant even before your period returns. Also, you can get pregnant while you are breast-feeding.     Mental Health  · Many women get the \"baby blues\" during the first few days after childbirth. You may lose sleep, feel irritable, and cry easily. You may feel happy one minute and sad the next. Hormone changes are one cause of these emotional changes. Also, the demands of a new baby, along with visits from relatives or other family needs, add to a mother's stress. The \"baby blues\" often peak around the fourth day. Then they ease up in less than 2 weeks. · If your moodiness or anxiety lasts for more than 2 weeks, or if you feel like life is not worth living, you may have postpartum depression. This is different for each mother. Some mothers with serious depression may worry intensely about their infant's well-being. Others may feel distant from their child. Some mothers might even feel that they might harm their baby. A mother may have signs of paranoia, wondering if someone is watching her. · With all the changes in your life, you may not know if you are depressed. Pregnancy sometimes causes changes in how you feel that are similar to the symptoms of depression. · Symptoms of depression include:  · Feeling sad or hopeless and losing interest in daily activities. These are the most common symptoms of depression. · Sleeping too much or not enough. · Feeling tired. You may feel as if you have no energy. · Eating too much or too little. · [employer] ([employer]) offers a Warm line; Chat with the Expert phone sessions; Information and Articles about Pregnancy and Postpartum Mood Disorders; Comprehensive List of Free Support Groups; Knowledgeable local coordinators who will offer support, information, and resources; Guide to Resources on [employer]; Calendar of events in the  mood disorders community; Latest News and Research; and [facility] [address] for [employer]. Remember - You are not alone; You are not to blame; With help, you will be well. 5-385-346-PPD(8837). WWW. POSTPARTUM. NET   · Writing or talking about death, such as writing suicide notes or talking about guns, knives, or pills. Keep the numbers for these national suicide hotlines: 0-360-191-TALK (8-357.505.6721) and 3-608-CLTYGGN (1-221.970.1336). If you or someone you know talks about suicide or feeling hopeless, get help right away. Constipation and Hemorrhoids  · Drink plenty of fluids, enough so that your urine is light yellow or clear like water. If you have kidney, heart, or liver disease and have to limit fluids, talk with your doctor before you increase the amount of fluids you drink. · Eat plenty of fiber each day. Have a bran muffin or bran cereal for breakfast, and try eating a piece of fruit for a mid-afternoon snack. · For painful, itchy hemorrhoids, put ice or a cold pack on the area several times a day for 10 minutes at a time. Follow this by putting a warm compress on the area for another 10 to 20 minutes or by sitting in a shallow, warm bath. When should you call for help? Call 911 anytime you think you may need emergency care. For example, call if:  · You are thinking of hurting yourself, your baby, or anyone else. · You passed out (lost consciousness). · You have symptoms of a blood clot in your lung (called a pulmonary embolism). These may include:    · Sudden chest pain. · Trouble breathing. · Coughing up blood. Call your doctor now or seek immediate medical care if:  · You have severe vaginal bleeding. · You are soaking through a pad each hour for 2 or more hours. · Your vaginal bleeding seems to be getting heavier or is still bright red 4 days after delivery. · You are dizzy or lightheaded, or you feel like you may faint. · You are vomiting or cannot keep fluids down. · You have a fever. · You have new or more belly pain. · You pass tissue (not just blood). · Your vaginal discharge smells bad. · Your belly feels tender or full and hard. · Your breasts are continuously painful or red.   · You feel sad, anxious, or hopeless for more than a few days. · You have sudden, severe pain in your belly. · You have symptoms of a blood clot in your leg (called a deep vein thrombosis),          such as:  · Pain in your calf, back of the knee, thigh, or groin. · Redness and swelling in your leg or groin. · You have symptoms of preeclampsia, such as:  · Sudden swelling of your face, hands, or feet. · New vision problems (such as dimness or blurring). · A severe headache. · Your blood pressure is higher than it should be or rises suddenly. · You have new nausea or vomiting. Watch closely for changes in your health, and be sure to contact your doctor if you have any problems. Additional Information:  Preventing Infection at Home    We care about preventing infection and avoiding the spread of germs - not only when you are in the hospital but also when you return home. When you return home from the hospital, it's important to take the following steps to help prevent infection and avoid spreading germs that could infect you and others. Ask everyone in your home to follow these guidelines, too. Clean Your Hands  · Clean your hands whenever your hands are visibly dirty, before you eat, before or after touching your mouth, nose or eyes, and before preparing food. Clean them after contact with body fluids, using the restroom, touching animals or changing diapers. · When washing hands, wet them with warm water and work up a lather. Rub hands for at least 15 seconds, then rinse them and pat them dry with a clean towel or paper towel. · When using hand sanitizers, it should take about 15 seconds to rub your hands dry. If not, you probably didn't apply enough . Cover Your Sneeze or Cough  Germs are released into the air whenever you sneeze or cough. To prevent the spread of infection:  · Turn away from other people before coughing or sneezing. · Cover your mouth or nose with a tissue when you cough or sneeze.  Put the tissue in the trash.  · If you don't have a tissue, cough or sneeze into your upper sleeve, not your hands. · Always clean your hands after coughing or sneezing. Care for Wounds  Your skin is your body's first line of defense against germs, but an open wound leaves an easy way for germs to enter your body. To prevent infection:  · Clean your hands before and after changing wound dressings, and wear gloves to change dressings if recommended by your doctor. · Take special care with IV lines or other devices inserted into the body. If you must touch them, clean your hands first.  · Follow any specific instructions from your doctor to care for your wounds. Contact your doctor if you experience any signs of infection, such as fever or increased redness at the surgical or wound site. Keep a Clean Home  · Clean or wipe commonly touched hard surfaces like door handles, sinks, tabletops, phones and TV remotes. · Use products labeled \"disinfectant\" to kill harmful bacteria and viruses. · Use a clean cloth or paper towel to clean and dry surfaces. Wiping surfaces with a dirty dishcloth, sponge or towel will only spread germs. · Never share toothbrushes, florence, drinking glasses, utensils, razor blades, face cloths or bath towels to avoid spreading germs. · Be sure that the linens that you sleep on are clean. · Keep pets away from wounds and wash your hands after touching pets, their toys or bedding. We care about you and your health. Remember, preventing infections is a   team effort between you, your family, friends and health care providers. These are general instructions for a healthy lifestyle:    No smoking/ No tobacco products/ Avoid exposure to second hand smoke    Surgeon General's Warning:  Quitting smoking now greatly reduces serious risk to your health.     Obesity, smoking, and sedentary lifestyle greatly increases your risk for illness    A healthy diet, regular physical exercise & weight monitoring are important for maintaining a healthy lifestyle    Recognize signs and symptoms of STROKE:    F-face looks uneven    A-arms unable to move or move unevenly    S-speech slurred or non-existent    T-time-call 911 as soon as signs and symptoms begin - DO NOT go       back to bed or wait to see if you get better - TIME IS BRAIN. I have had the opportunity to make my options or choices for discharge. I have received and understand these instructions.

## 2020-03-03 NOTE — LACTATION NOTE
This note was copied from a baby's chart. Initial Lactation Consultation: Infant born vaginally last evening to a  mom at 44 1/7 weeks gestation. She has a history of PCOS and infertility although this infant was conceived spontaneously. Mom noted breast changes and has easily expressed colostrum. Infant seen at breast, deep latch noted with swallowing. Discussed positioning tips and encouraged breast massage to facilitate milk transfer. Demonstrated manual expression and suggested that mom do this following nursing sessions and if infant fails to nurse well. She will call for assistance as needed. Feeding Plan: Mother will keep baby skin to skin as often as possible, feed on demand, 8-12x/day , respond to feeding cues, obtain latch, listen for audible swallowing, be aware of signs of oxytocin release/ cramping,thirst,sleepiness while breastfeeding, offer both breasts,and will not limit feedings. Mother agrees to utilize breast massage while nursing to facilitate lactogenesis.

## 2020-03-04 VITALS
DIASTOLIC BLOOD PRESSURE: 78 MMHG | WEIGHT: 188 LBS | HEIGHT: 67 IN | TEMPERATURE: 98.1 F | BODY MASS INDEX: 29.51 KG/M2 | RESPIRATION RATE: 18 BRPM | SYSTOLIC BLOOD PRESSURE: 129 MMHG | HEART RATE: 97 BPM | OXYGEN SATURATION: 95 %

## 2020-03-04 PROCEDURE — 74011250637 HC RX REV CODE- 250/637: Performed by: OBSTETRICS & GYNECOLOGY

## 2020-03-04 RX ADMIN — IBUPROFEN 800 MG: 400 TABLET, FILM COATED ORAL at 07:07

## 2020-03-04 NOTE — LACTATION NOTE
This note was copied from a baby's chart. Mom and baby scheduled for discharge. I did not see the baby at the breast. Mom states the baby is latching and nursing well. She said she has no questions for lactation before discharge. Baby's weight loss is --7.5% at 34 hours of life. [de-identified] bili is 7.9 in the low intermediate risk zone. Baby has had 2 wets and 4 stools over the last 24 hours. Mom has a follow up appointment with the pediatrician tomorrow.

## 2020-03-04 NOTE — ROUTINE PROCESS
Bedside and Verbal shift change report given to ROBBIE Valladares (oncoming nurse) by Cassell Sacks (offgoing nurse). Report included the following information SBAR.

## 2020-03-04 NOTE — ROUTINE PROCESS
Bedside shift change report given to ERLIN Taylor RN (oncoming nurse) by SHANTAL Morris (offgoing nurse). Report included the following information SBAR, Kardex, Intake/Output and MAR.

## 2020-03-04 NOTE — PROGRESS NOTES
Post-Partum Day Number 2 Progress Note    Enio Barreto     Assessment: Doing well, post partum day 2    Plan:   1. Discharge home today  2. Follow up in office in 6 weeks  3. Post partum activity advised, diet as tolerated      Information for the patient's :  Fred Biggs [[id number]]   Vaginal, Spontaneous     Subjective:  Patient doing well without significant complaint. Voiding without difficulty, normal lochia. Vitals:  Visit Vitals  /78   Pulse 97   Temp 98.1 °F (36.7 °C)   Resp 18   Ht 5' 7\" (1.702 m)   Wt 85.3 kg (188 lb)   SpO2 95%   Breastfeeding Unknown   BMI 29.44 kg/m²     Temp (24hrs), Av.2 °F (36.8 °C), Min:97.9 °F (36.6 °C), Max:98.7 °F (37.1 °C)      Exam:         Patient without distress. Abdomen soft, fundus firm, nontender                 Lower extremities are negative for swelling, cords or tenderness. Labs:     Lab Results   Component Value Date/Time    WBC 8.2 2020 07:22 PM    HGB 12.4 2020 07:22 PM    HCT 36.7 2020 07:22 PM    PLATELET 475  07:22 PM       No results found for this or any previous visit (from the past 24 hour(s)).